# Patient Record
Sex: FEMALE | Race: BLACK OR AFRICAN AMERICAN | NOT HISPANIC OR LATINO | Employment: FULL TIME | URBAN - METROPOLITAN AREA
[De-identification: names, ages, dates, MRNs, and addresses within clinical notes are randomized per-mention and may not be internally consistent; named-entity substitution may affect disease eponyms.]

---

## 2018-06-30 PROBLEM — O42.112 PRETERM PREMATURE RUPTURE OF MEMBRANES (PPROM) WITH ONSET OF LABOR AFTER 24 HOURS OF RUPTURE IN SECOND TRIMESTER, ANTEPARTUM: Status: ACTIVE | Noted: 2018-06-30

## 2018-06-30 PROBLEM — O41.1220 CHORIOAMNIONITIS IN SECOND TRIMESTER: Status: ACTIVE | Noted: 2018-06-30

## 2018-07-01 PROBLEM — O42.112 PRETERM PREMATURE RUPTURE OF MEMBRANES (PPROM) WITH ONSET OF LABOR AFTER 24 HOURS OF RUPTURE IN SECOND TRIMESTER, ANTEPARTUM: Status: RESOLVED | Noted: 2018-06-30 | Resolved: 2018-07-01

## 2018-07-01 PROBLEM — O41.1220 CHORIOAMNIONITIS IN SECOND TRIMESTER: Status: RESOLVED | Noted: 2018-06-30 | Resolved: 2018-07-01

## 2020-04-21 ENCOUNTER — TELEPHONE (OUTPATIENT)
Dept: OBSTETRICS AND GYNECOLOGY | Facility: CLINIC | Age: 25
End: 2020-04-21

## 2020-04-21 NOTE — TELEPHONE ENCOUNTER
----- Message from Alfredo Rojas sent at 4/21/2020  9:19 AM CDT -----  Contact: Pt  Pt would like to be called back asap regarding scheduling an Initial OB appt.    Pt can be reached at [phone].    Thanks

## 2020-04-29 DIAGNOSIS — O34.31 CERVICAL INCOMPETENCE AFFECTING MANAGEMENT OF PREGNANCY IN FIRST TRIMESTER, ANTEPARTUM: Primary | ICD-10-CM

## 2020-05-13 ENCOUNTER — OFFICE VISIT (OUTPATIENT)
Dept: URGENT CARE | Facility: CLINIC | Age: 25
End: 2020-05-13
Payer: COMMERCIAL

## 2020-05-13 VITALS
TEMPERATURE: 98 F | HEIGHT: 67 IN | WEIGHT: 120 LBS | OXYGEN SATURATION: 100 % | BODY MASS INDEX: 18.83 KG/M2 | HEART RATE: 75 BPM

## 2020-05-13 DIAGNOSIS — Z20.822 CLOSE EXPOSURE TO COVID-19 VIRUS: Primary | ICD-10-CM

## 2020-05-13 PROCEDURE — 99201 PR OFFICE/OUTPT VISIT,NEW,LEVL I: ICD-10-PCS | Mod: S$GLB,,, | Performed by: NURSE PRACTITIONER

## 2020-05-13 PROCEDURE — U0002 COVID-19 LAB TEST NON-CDC: HCPCS

## 2020-05-13 PROCEDURE — 99201 PR OFFICE/OUTPT VISIT,NEW,LEVL I: CPT | Mod: S$GLB,,, | Performed by: NURSE PRACTITIONER

## 2020-05-13 NOTE — PROGRESS NOTES
"Subjective:       Patient ID: Clementina Santacruz is a 24 y.o. female.    Vitals:  height is 5' 7" (1.702 m) and weight is 54.4 kg (120 lb). Her temperature is 98.3 °F (36.8 °C). Her pulse is 75. Her oxygen saturation is 100%.     Chief Complaint: COVID-19 Concerns    23 y/o female, 9 weeks pregnant, presents for COVID-19 screening. Close contact with Co worker who tested positive.    Other   This is a new problem. The current episode started today. The problem occurs constantly. The problem has been unchanged. Pertinent negatives include no arthralgias, chest pain, chills, congestion, coughing, fatigue, fever, headaches, joint swelling, myalgias, nausea, rash, sore throat, vertigo, vomiting or weakness. Nothing aggravates the symptoms. She has tried nothing for the symptoms.       Constitution: Negative for chills, fatigue and fever.   HENT: Negative for congestion and sore throat.    Neck: Negative for painful lymph nodes.   Cardiovascular: Negative for chest pain and leg swelling.   Eyes: Negative for double vision and blurred vision.   Respiratory: Negative for cough and shortness of breath.    Gastrointestinal: Negative for nausea, vomiting and diarrhea.   Genitourinary: Negative for dysuria, frequency, urgency and history of kidney stones.   Musculoskeletal: Negative for joint pain, joint swelling, muscle cramps and muscle ache.   Skin: Negative for color change, pale, rash and bruising.   Allergic/Immunologic: Negative for seasonal allergies.   Neurological: Negative for dizziness, history of vertigo, light-headedness, passing out and headaches.   Hematologic/Lymphatic: Negative for swollen lymph nodes.   Psychiatric/Behavioral: Negative for nervous/anxious, sleep disturbance and depression. The patient is not nervous/anxious.        Objective:      Physical Exam            Assessment:       1. Close Exposure to Covid-19 Virus        Plan:       Counseled patient and answered questions in regards to COVID-19 " testing and diagnosis.    Close Exposure to Covid-19 Virus  -     COVID-19 Routine Screening      Patient Instructions   Instructions for Patients with Confirmed or Suspected COVID-19    If you are awaiting your test result, you will either be called or it will be released to the patient portal.  If you have any questions about your test, please visit [email or web address] or call our COVID-19 information line at [phone].       Stay home and stay away from family members and friends. The CDC says, you can leave home after these three things have happened: 1) You have had no fever for at least 72 hours (that is three full days of no fever without the use of medicine that reduces fevers) 2) AND other symptoms have improved (for example, when your cough or shortness of breath have improved) 3) AND at least 7 days have passed since your symptoms first appeared.   Separate yourself from other people and animals in your home.   Call ahead before visiting your doctor.   Wear a facemask.   Cover your coughs and sneezes.   Wash your hands often with soap and water; hand  can be used, too.   Avoid sharing personal household items.   Wipe down surfaces used daily.   Monitor your symptoms. Seek prompt medical attention if your illness is worsening (e.g., difficulty breathing).    Before seeking care, call your healthcare provider.   If you have a medical emergency and need to call 911, notify the dispatch personnel that you have, or are being evaluated for COVID-19. If possible, put on a facemask before emergency medical services arrive.        Recommended precautions for household members, intimate partners, and caregivers in a home setting of a patient with symptomatic laboratory-confirmed COVID-19 or a patient under investigation.  Household members, intimate partners, and caregivers in the home setting awaiting tests results have close contact with a person with symptomatic,  laboratory-confirmed COVID-19 or a person under investigation. Close contacts should monitor their health; they should call their provider right away if they develop symptoms suggestive of COVID-19 (e.g., fever, cough, shortness of breath).    Close contacts should also follow these recommendations:   Make sure that you understand and can help the patient follow their provider's instructions for medication(s) and care. You should help the patient with basic needs in the home and provide support for getting groceries, prescriptions, and other personal needs.   Monitor the patient's symptoms. If the patient is getting sicker, call his or her healthcare provider and tell them that the patient has laboratory-confirmed COVID-19. If the patient has a medical emergency and you need to call 911, notify the dispatch personnel that the patient has, or is being evaluated for COVID-19.   Household members should stay in another room or be  from the patient. Household members should use a separate bedroom and bathroom, if available.   Prohibit visitors.   Household members should care for any pets in the home.   Make sure that shared spaces in the home have good air flow, such as by an air conditioner or an opened window, weather permitting.   Perform hand hygiene frequently. Wash your hands often with soap and water for at least 20 seconds or use an alcohol-based hand  (that contains > 60% alcohol) covering all surfaces of your hands and rubbing them together until they feel dry. Soap and water should be used preferentially.   Avoid touching your eyes, nose, and mouth.   The patient should wear a facemask. If the patient is not able to wear a facemask (for example, because it causes trouble breathing), caregivers should wear a mask when they are in the same room as the patient.   Wear a disposable facemask and gloves when you touch or have contact with the patient's blood, stool, or body fluids, such  as saliva, sputum, nasal mucus, vomit, urine.  o Throw out disposable facemasks and gloves after using them. Do not reuse.  o When removing personal protective equipment, first remove and dispose of gloves. Then, immediately clean your hands with soap and water or alcohol-based hand . Next, remove and dispose of facemask, and immediately clean your hands again with soap and water or alcohol-based hand .   You should not share dishes, drinking glasses, cups, eating utensils, towels, bedding, or other items with the patient. After the patient uses these items, you should wash them thoroughly (see below Wash laundry thoroughly).   Clean all high-touch surfaces, such as counters, tabletops, doorknobs, bathroom fixtures, toilets, phones, keyboards, tablets, and bedside tables, every day. Also, clean any surfaces that may have blood, stool, or body fluids on them.   Use a household cleaning spray or wipe, according to the label instructions. Labels contain instructions for safe and effective use of the cleaning product including precautions you should take when applying the product, such as wearing gloves and making sure you have good ventilation during use of the product.   Wash laundry thoroughly.  o Immediately remove and wash clothes or bedding that have blood, stool, or body fluids on them.  o Wear disposable gloves while handling soiled items and keep soiled items away from your body. Clean your hands (with soap and water or an alcohol-based hand ) immediately after removing your gloves.  o Read and follow directions on labels of laundry or clothing items and detergent. In general, using a normal laundry detergent according to washing machine instructions and dry thoroughly using the warmest temperatures recommended on the clothing label.   Place all used disposable gloves, facemasks, and other contaminated items in a lined container before disposing of them with other household  waste. Clean your hands (with soap and water or an alcohol-based hand ) immediately after handling these items. Soap and water should be used preferentially if hands are visibly dirty.   Discuss any additional questions with your state or local health department or healthcare provider. Check available hours when contacting your local health department.    For more information see CDC link below.      https://www.cdc.gov/coronavirus/2019-ncov/hcp/guidance-prevent-spread.html#precautions        Sources:  [facility], [place] [facility]

## 2020-05-13 NOTE — PATIENT INSTRUCTIONS
Instructions for Patients with Confirmed or Suspected COVID-19    If you are awaiting your test result, you will either be called or it will be released to the patient portal.  If you have any questions about your test, please visit [email or web address] or call our COVID-19 information line at [phone].       Stay home and stay away from family members and friends. The CDC says, you can leave home after these three things have happened: 1) You have had no fever for at least 72 hours (that is three full days of no fever without the use of medicine that reduces fevers) 2) AND other symptoms have improved (for example, when your cough or shortness of breath have improved) 3) AND at least 7 days have passed since your symptoms first appeared.   Separate yourself from other people and animals in your home.   Call ahead before visiting your doctor.   Wear a facemask.   Cover your coughs and sneezes.   Wash your hands often with soap and water; hand  can be used, too.   Avoid sharing personal household items.   Wipe down surfaces used daily.   Monitor your symptoms. Seek prompt medical attention if your illness is worsening (e.g., difficulty breathing).    Before seeking care, call your healthcare provider.   If you have a medical emergency and need to call 911, notify the dispatch personnel that you have, or are being evaluated for COVID-19. If possible, put on a facemask before emergency medical services arrive.        Recommended precautions for household members, intimate partners, and caregivers in a home setting of a patient with symptomatic laboratory-confirmed COVID-19 or a patient under investigation.  Household members, intimate partners, and caregivers in the home setting awaiting tests results have close contact with a person with symptomatic, laboratory-confirmed COVID-19 or a person under investigation. Close contacts should monitor their health; they should call their  provider right away if they develop symptoms suggestive of COVID-19 (e.g., fever, cough, shortness of breath).    Close contacts should also follow these recommendations:   Make sure that you understand and can help the patient follow their provider's instructions for medication(s) and care. You should help the patient with basic needs in the home and provide support for getting groceries, prescriptions, and other personal needs.   Monitor the patient's symptoms. If the patient is getting sicker, call his or her healthcare provider and tell them that the patient has laboratory-confirmed COVID-19. If the patient has a medical emergency and you need to call 911, notify the dispatch personnel that the patient has, or is being evaluated for COVID-19.   Household members should stay in another room or be  from the patient. Household members should use a separate bedroom and bathroom, if available.   Prohibit visitors.   Household members should care for any pets in the home.   Make sure that shared spaces in the home have good air flow, such as by an air conditioner or an opened window, weather permitting.   Perform hand hygiene frequently. Wash your hands often with soap and water for at least 20 seconds or use an alcohol-based hand  (that contains > 60% alcohol) covering all surfaces of your hands and rubbing them together until they feel dry. Soap and water should be used preferentially.   Avoid touching your eyes, nose, and mouth.   The patient should wear a facemask. If the patient is not able to wear a facemask (for example, because it causes trouble breathing), caregivers should wear a mask when they are in the same room as the patient.   Wear a disposable facemask and gloves when you touch or have contact with the patient's blood, stool, or body fluids, such as saliva, sputum, nasal mucus, vomit, urine.  o Throw out disposable facemasks and gloves after using them. Do not  reuse.  o When removing personal protective equipment, first remove and dispose of gloves. Then, immediately clean your hands with soap and water or alcohol-based hand . Next, remove and dispose of facemask, and immediately clean your hands again with soap and water or alcohol-based hand .   You should not share dishes, drinking glasses, cups, eating utensils, towels, bedding, or other items with the patient. After the patient uses these items, you should wash them thoroughly (see below Wash laundry thoroughly).   Clean all high-touch surfaces, such as counters, tabletops, doorknobs, bathroom fixtures, toilets, phones, keyboards, tablets, and bedside tables, every day. Also, clean any surfaces that may have blood, stool, or body fluids on them.   Use a household cleaning spray or wipe, according to the label instructions. Labels contain instructions for safe and effective use of the cleaning product including precautions you should take when applying the product, such as wearing gloves and making sure you have good ventilation during use of the product.   Wash laundry thoroughly.  o Immediately remove and wash clothes or bedding that have blood, stool, or body fluids on them.  o Wear disposable gloves while handling soiled items and keep soiled items away from your body. Clean your hands (with soap and water or an alcohol-based hand ) immediately after removing your gloves.  o Read and follow directions on labels of laundry or clothing items and detergent. In general, using a normal laundry detergent according to washing machine instructions and dry thoroughly using the warmest temperatures recommended on the clothing label.   Place all used disposable gloves, facemasks, and other contaminated items in a lined container before disposing of them with other household waste. Clean your hands (with soap and water or an alcohol-based hand ) immediately after handling these  items. Soap and water should be used preferentially if hands are visibly dirty.   Discuss any additional questions with your state or local health department or healthcare provider. Check available hours when contacting your local health department.    For more information see CDC link below.      https://www.cdc.gov/coronavirus/2019-ncov/hcp/guidance-prevent-spread.html#precautions        Sources:  [facility], [facility] and [facility]

## 2020-05-14 ENCOUNTER — TELEPHONE (OUTPATIENT)
Dept: URGENT CARE | Facility: CLINIC | Age: 25
End: 2020-05-14

## 2020-05-14 LAB — SARS-COV-2 RNA RESP QL NAA+PROBE: NOT DETECTED

## 2020-05-14 NOTE — TELEPHONE ENCOUNTER
Spoke with patient.  Informed her of negative Coronavirus test results.  Patient denies any further complaints or concerns at this time.    Your test was NEGATIVE for COVID-19 (coronavirus).  If you still have symptoms, treat with rest, fluids, and over-the-counter medications.  Continue to stay home and practice proper handwashing.     If your symptoms worsen or if you have any other concerns, please contact [facility] at [phone].     Sincerely,    Gwendolyn Sweet PA-C

## 2020-07-10 PROBLEM — O20.9 VAGINAL BLEEDING BEFORE 22 WEEKS GESTATION: Status: ACTIVE | Noted: 2020-07-10

## 2020-07-10 PROBLEM — O03.9 SPONTANEOUS MISCARRIAGE: Status: ACTIVE | Noted: 2020-07-10

## 2020-07-11 PROBLEM — O20.9 VAGINAL BLEEDING BEFORE 22 WEEKS GESTATION: Status: RESOLVED | Noted: 2020-07-10 | Resolved: 2020-07-11

## 2020-07-11 PROBLEM — O03.9 SPONTANEOUS MISCARRIAGE: Status: RESOLVED | Noted: 2020-07-10 | Resolved: 2020-07-11

## 2021-05-06 ENCOUNTER — PATIENT MESSAGE (OUTPATIENT)
Dept: RESEARCH | Facility: HOSPITAL | Age: 26
End: 2021-05-06

## 2021-11-17 ENCOUNTER — CLINICAL SUPPORT (OUTPATIENT)
Dept: URGENT CARE | Facility: CLINIC | Age: 26
End: 2021-11-17
Payer: MEDICAID

## 2021-11-17 DIAGNOSIS — Z11.59 SCREENING FOR VIRAL DISEASE: Primary | ICD-10-CM

## 2021-11-17 LAB
CTP QC/QA: YES
SARS-COV-2 RDRP RESP QL NAA+PROBE: NEGATIVE

## 2021-11-17 PROCEDURE — U0002 COVID-19 LAB TEST NON-CDC: HCPCS | Mod: QW,S$GLB,, | Performed by: FAMILY MEDICINE

## 2021-11-17 PROCEDURE — U0002: ICD-10-PCS | Mod: QW,S$GLB,, | Performed by: FAMILY MEDICINE

## 2024-12-29 ENCOUNTER — OFFICE VISIT (OUTPATIENT)
Dept: URGENT CARE | Facility: CLINIC | Age: 29
End: 2024-12-29
Payer: COMMERCIAL

## 2024-12-29 VITALS
WEIGHT: 120 LBS | SYSTOLIC BLOOD PRESSURE: 103 MMHG | OXYGEN SATURATION: 98 % | RESPIRATION RATE: 18 BRPM | BODY MASS INDEX: 18.83 KG/M2 | HEIGHT: 67 IN | DIASTOLIC BLOOD PRESSURE: 67 MMHG | HEART RATE: 88 BPM | TEMPERATURE: 99 F

## 2024-12-29 DIAGNOSIS — J10.1 INFLUENZA A: ICD-10-CM

## 2024-12-29 DIAGNOSIS — R05.9 COUGH, UNSPECIFIED TYPE: Primary | ICD-10-CM

## 2024-12-29 LAB
CTP QC/QA: YES
POC MOLECULAR INFLUENZA A AGN: POSITIVE
POC MOLECULAR INFLUENZA B AGN: NEGATIVE

## 2024-12-29 PROCEDURE — 99214 OFFICE O/P EST MOD 30 MIN: CPT | Mod: S$GLB,,, | Performed by: FAMILY MEDICINE

## 2024-12-29 RX ORDER — PROMETHAZINE HYDROCHLORIDE 25 MG/1
25 SUPPOSITORY RECTAL EVERY 6 HOURS PRN
Qty: 5 SUPPOSITORY | Refills: 0 | Status: SHIPPED | OUTPATIENT
Start: 2024-12-29 | End: 2024-12-29

## 2024-12-29 RX ORDER — OSELTAMIVIR PHOSPHATE 75 MG/1
75 CAPSULE ORAL 2 TIMES DAILY
Qty: 10 CAPSULE | Refills: 0 | Status: SHIPPED | OUTPATIENT
Start: 2024-12-29 | End: 2025-01-03

## 2024-12-29 RX ORDER — ERGOCALCIFEROL 1.25 MG/1
50000 CAPSULE ORAL
COMMUNITY
Start: 2024-10-04

## 2024-12-29 RX ORDER — DIPHENOXYLATE HYDROCHLORIDE AND ATROPINE SULFATE 2.5; .025 MG/1; MG/1
1 TABLET ORAL 4 TIMES DAILY PRN
Qty: 20 TABLET | Refills: 0 | Status: SHIPPED | OUTPATIENT
Start: 2024-12-29 | End: 2024-12-29

## 2024-12-29 RX ORDER — NAPROXEN 375 MG/1
375 TABLET ORAL 2 TIMES DAILY
Qty: 20 TABLET | Refills: 0 | Status: SHIPPED | OUTPATIENT
Start: 2024-12-29

## 2024-12-29 RX ORDER — PROMETHAZINE HYDROCHLORIDE 25 MG/1
25 TABLET ORAL EVERY 6 HOURS PRN
Qty: 20 TABLET | Refills: 0 | Status: SHIPPED | OUTPATIENT
Start: 2024-12-29 | End: 2024-12-29

## 2024-12-29 RX ORDER — DEXTROMETHORPHAN HBR, GUAIFENESIN AND PSEUDOEPHEDRINE HCL 60; 380; 20 MG/1; MG/1; MG/1
1 TABLET ORAL EVERY 6 HOURS PRN
Qty: 20 TABLET | Refills: 0 | Status: SHIPPED | OUTPATIENT
Start: 2024-12-29

## 2024-12-29 NOTE — LETTER
December 29, 2024  Clementina Santacruz  [address] [place] [address]                [facility]  [address] [place] [address], [address]  [place] [address]  Phone: [phone]  Fax: [phone] Clementina Santacruz was seen and treated in our Urgent Care department on 12/29/2024. She may return to work in 2 - 3 days.      If you have any questions or concerns, please don't hesitate to call.        Sincerely,        Krzysztof Conde MD

## 2024-12-29 NOTE — PATIENT INSTRUCTIONS
Please drink plenty of fluids.  Please get plenty of rest.  Please return here or go to the Emergency Department for any concerns or worsening of condition.  If you were given wait & see antibiotics, please wait 3-5 days before taking them, and only take them if your symptoms have worsened or not improved.  If you do begin taking the antibiotics, please take them to completion.  If you were prescribed antibiotics, please take them to completion.  If you were prescribed a narcotic medication, do not drive or operate heavy equipment or machinery while taking these medications.    You were given a decongestant (RESCON or POLY VENT Dm).  If your insurance does not cover it or you cannot afford it, it is ok to use the over the counter products listed below.  If you do not have Hypertension or any history of palpitations, it is ok to take over the counter Sudafed or Mucinex D or Allegra-D or Claritin-D or Zyrtec-D.  If you do take one of the above, it is ok to combine that with plain over the counter Mucinex or Allegra or Claritin or Zyrtec.  If for example you are taking Zyrtec -D, you can combine that with Mucinex, but not Mucinex-D.  If you are taking Mucinex-D, you can combine that with plain Allegra or Claritin or Zyrtec.   If you do have Hypertension or palpitations, it is safe to take Coricidin HBP for relief of sinus symptoms.    We recommend you take over the counter Flonase (Fluticasone) or another nasally inhaled steroid unless you are already taking one.  Nasal irrigation with a saline spray or Netti Pot like device per their directions is also recommended.  If not allergic, please take over the counter Tylenol (Acetaminophen) and/or Motrin (Ibuprofen) as directed for control of pain and/or fever.    Robitussin DM 2 teas every 4 hours as needed for cough.  If you  smoke, please stop smoking.    Please follow up with your primary care doctor or specialist as needed.  Nikita Jasmine.,  MD  [phone]    You must understand that you have received treatment at an Urgent Care facility only, and that you may be  released before all of your medical problems are known or treated. Urgent Care facilities are not equipped to  handle life threatening emergencies. It is recommended that you seek care at an Emergency Department for  further evaluation of worsening or concerning symptoms, or possibly life threatening conditions as  discussed.    Influenza (Adult)    Influenza is also called the flu. It is a viral illness that affects the air passages of your lungs. It is different from the common cold. The flu can easily be passed from one to person to another. It may be spread through the air by coughing and sneezing. Or it can be spread by touching the sick person and then touching your own eyes, nose, or mouth.  The flu starts 1 to 3 days after you are exposed to the flu virus. It may last for 1 to 2 weeks. You usually dont need to take antibiotics unless you have a complication. This might be an ear or sinus infection or pneumonia.  Symptoms of the flu may be mild or severe. They can include extreme tiredness (wanting to stay in bed all day), chills, fevers, muscle aches, soreness with eye movement, headache, and a dry, hacking cough.  Home care  Follow these guidelines when caring for yourself at home:  Avoid being around cigarette smoke, whether yours or other peoples.  Acetaminophen or ibuprofen will help ease your fever, muscle aches, and headache. Dont give aspirin to anyone younger than 18 who has the flu. Aspirin can harm the liver.  Nausea and loss of appetite are common with the flu. Eat light meals. Drink 6 to 8 glasses of liquids every day. Good choices are water, sport drinks, soft drinks without caffeine, juices, tea, and soup. Extra fluids will also help loosen secretions in your nose and lungs.  Over-the-counter cold medicines will not make the flu go away faster. But the medicines may  help with coughing, sore throat, and congestion in your nose and sinuses. Dont use a decongestant if you have high blood pressure.  Stay home until your fever has been gone for at least 24 hours without using medicine to reduce fever.  Follow-up care  Follow up with your healthcare provider, or as advised, if you are not getting better over the next week.  If you are 65 or older, talk with your provider about getting a pneumococcal vaccine every 5 years. You should also get this vaccine if you have chronic asthma or COPD. All adults should get a flu vaccine every fall. Ask your provider about this.  When to seek medical advice  Call your healthcare provider right away if any of these occur:  Cough with lots of colored sputum (mucus) or blood in your sputum  Chest pain, shortness of breath, wheezing, or difficulty breathing  Severe headache, or face, neck, or ear pain  New rash with fever  Fever of 100.4°F (38°C) or higher, or as directed by your healthcare provider  Confusion, behavior change, or seizure  Severe weakness or dizziness  You get a fever or cough after getting better for a few days  Date Last Reviewed: 12/23/2014  © 4424-9190 [employer] [address] [place] [address], [place] [address]. All rights reserved. This information is not intended as a substitute for professional medical care. Always follow your healthcare professional's instructions.

## 2024-12-29 NOTE — PROGRESS NOTES
"Subjective:      Patient ID: Clementina Santacruz is a 29 y.o. female.    Vitals:  height is 5' 7" (1.702 m) and weight is 54.4 kg (120 lb). Her oral temperature is 99.3 °F (37.4 °C). Her blood pressure is 103/67 and her pulse is 88. Her respiration is 18 and oxygen saturation is 98%.     Chief Complaint: Sinus Problem    Sinus Problem  This is a new problem. The current episode started yesterday. The problem has been gradually worsening since onset. There has been no fever. Her pain is at a severity of 7/10. The pain is moderate. Associated symptoms include chills, congestion, coughing, headaches, sneezing and a sore throat. Pertinent negatives include no ear pain, shortness of breath or sinus pressure. (Rhinorrhea, ear congestion, sweats       ) Treatments tried: mucinex, generic cold and flu. The treatment provided no relief.       Constitution: Positive for chills.   HENT:  Positive for congestion and sore throat. Negative for ear pain and sinus pressure.    Cardiovascular: Negative.    Eyes: Negative.    Respiratory:  Positive for cough. Negative for shortness of breath.    Gastrointestinal: Negative.    Endocrine: negative.   Genitourinary: Negative.    Musculoskeletal: Negative.    Skin: Negative.    Allergic/Immunologic: Negative.  Positive for sneezing.   Neurological:  Positive for headaches.   Hematologic/Lymphatic: Negative.    Psychiatric/Behavioral: Negative.        Objective:     Physical Exam   Constitutional: She is oriented to person, place, and time. She appears well-developed.   HENT:   Head: Normocephalic and atraumatic.   Ears:   Right Ear: External ear normal.   Left Ear: External ear normal.   Nose: Nose normal.   Mouth/Throat: Mucous membranes are normal.   Eyes: Conjunctivae and lids are normal.   Neck: Trachea normal. Neck supple.   Cardiovascular: Normal rate, regular rhythm and normal heart sounds.   Pulmonary/Chest: Effort normal and breath sounds normal. No respiratory distress.   Abdominal: " Normal appearance and bowel sounds are normal. She exhibits no distension and no mass. Soft. There is no abdominal tenderness.   Musculoskeletal: Normal range of motion.         General: Normal range of motion.   Neurological: She is alert and oriented to person, place, and time. She has normal strength.   Skin: Skin is warm, dry, intact, not diaphoretic and not pale.   Psychiatric: Her speech is normal and behavior is normal. Judgment and thought content normal.   Nursing note and vitals reviewed.    Results for orders placed or performed in visit on 12/29/24   POCT Influenza A/B MOLECULAR    Collection Time: 12/29/24  5:16 PM   Result Value Ref Range    POC Molecular Influenza A Ag Positive (A) Negative    POC Molecular Influenza B Ag Negative Negative     Acceptable Yes          Assessment:     1. Cough, unspecified type    2. Influenza A        Plan:       Cough, unspecified type  -     POCT Influenza A/B MOLECULAR    Influenza A  -     Discontinue: promethazine (PHENERGAN) 25 MG tablet; Take 1 tablet (25 mg total) by mouth every 6 (six) hours as needed for Nausea.  Dispense: 20 tablet; Refill: 0  -     Discontinue: promethazine (PHENERGAN) 25 MG suppository; Place 1 suppository (25 mg total) rectally every 6 (six) hours as needed for Nausea.  Dispense: 5 suppository; Refill: 0  -     Discontinue: diphenoxylate-atropine 2.5-0.025 mg (LOMOTIL) 2.5-0.025 mg per tablet; Take 1 tablet by mouth 4 (four) times daily as needed for Diarrhea.  Dispense: 20 tablet; Refill: 0  -     oseltamivir (TAMIFLU) 75 MG capsule; Take 1 capsule (75 mg total) by mouth 2 (two) times daily. for 5 days  Dispense: 10 capsule; Refill: 0  -     pseudoephedrine-DM-guaiFENesin (POLY-VENT DM) 60- mg Tab; Take 1 tablet by mouth every 6 (six) hours as needed.  Dispense: 20 tablet; Refill: 0  -     naproxen (NAPROSYN) 375 MG tablet; Take 1 tablet (375 mg total) by mouth 2 (two) times daily.  Dispense: 20 tablet; Refill:  0      Please drink plenty of fluids.  Please get plenty of rest.  Clear liquid diet as instructed for 2 - 3 days or until symptoms improve.  Please return here or go to the Emergency Department for any concerns or worsening of condition.  If you were prescribed antibiotics, please take them to completion.  If not allergic, please take over the counter Tylenol (Acetaminophen) as directed for control of pain and/or fever.  Motrin (advil) or Alleve may help a fever, but they may also worsen your Nausea and Vomiting.    Please follow up with your primary care doctor or specialist as needed.    Nikita Jasmine MD  [phone]    If you  smoke, please stop smoking.    You must understand that you have received treatment at an Urgent Care facility only, and that you may be  released before all of your medical problems are known or treated. Urgent Care facilities are not equipped to  handle life threatening emergencies. It is recommended that you seek care at an Emergency Department for  further evaluation of worsening or concerning symptoms, or possibly life threatening conditions as  discussed.